# Patient Record
Sex: MALE | Race: WHITE | Employment: FULL TIME | URBAN - METROPOLITAN AREA
[De-identification: names, ages, dates, MRNs, and addresses within clinical notes are randomized per-mention and may not be internally consistent; named-entity substitution may affect disease eponyms.]

---

## 2020-06-19 ENCOUNTER — OFFICE VISIT (OUTPATIENT)
Dept: FAMILY MEDICINE CLINIC | Facility: CLINIC | Age: 35
End: 2020-06-19
Payer: COMMERCIAL

## 2020-06-19 VITALS
DIASTOLIC BLOOD PRESSURE: 68 MMHG | HEIGHT: 68.5 IN | WEIGHT: 128 LBS | HEART RATE: 71 BPM | SYSTOLIC BLOOD PRESSURE: 126 MMHG | BODY MASS INDEX: 19.18 KG/M2 | RESPIRATION RATE: 16 BRPM | TEMPERATURE: 98 F | OXYGEN SATURATION: 98 %

## 2020-06-19 DIAGNOSIS — Z12.4 SCREENING FOR MALIGNANT NEOPLASM OF CERVIX: ICD-10-CM

## 2020-06-19 DIAGNOSIS — Z13.89 SCREENING FOR GENITOURINARY CONDITION: ICD-10-CM

## 2020-06-19 DIAGNOSIS — Z00.00 PHYSICAL EXAM, ANNUAL: Primary | ICD-10-CM

## 2020-06-19 DIAGNOSIS — Z00.00 LABORATORY EXAMINATION ORDERED AS PART OF A ROUTINE GENERAL MEDICAL EXAMINATION: ICD-10-CM

## 2020-06-19 DIAGNOSIS — D22.9 MULTIPLE NEVI: ICD-10-CM

## 2020-06-19 PROCEDURE — 99385 PREV VISIT NEW AGE 18-39: CPT | Performed by: FAMILY MEDICINE

## 2020-06-19 NOTE — PROGRESS NOTES
Ehsan Yusuf is a 29year old male who presents for a complete physical exam.   HPI:   Pt has no complaints. On June 8 his daughter was born. She has cystic fibrosis and she had some bowel obstruction with meconium. She underwent 2 surgeries.   Patient 19.18 kg/m².    GENERAL: well developed, well nourished,in no apparent distress  SKIN: no rashes,no suspicious lesions, multiple nevi on the back  HEENT: atraumatic, normocephalic,ears and throat are clear  EYES:PERRLA, EOMI, conjunctiva are clear  NECK: tyler asked to return for CPX in 1 year.

## 2020-06-19 NOTE — PATIENT INSTRUCTIONS
Healthy diet. Stay active. Call [phone] to schedule  blood test.    Call dermatologist for evaluation.

## 2020-06-26 ENCOUNTER — TELEPHONE (OUTPATIENT)
Dept: FAMILY MEDICINE CLINIC | Facility: CLINIC | Age: 35
End: 2020-06-26

## 2020-06-26 NOTE — TELEPHONE ENCOUNTER
Spoke with the pt and informed him that he can call to schedule his Nurse visit for his Tdap vaccine. His vaccine is in the lab refrigerator in the door on the second shelf.

## 2024-12-20 ENCOUNTER — LAB ENCOUNTER (OUTPATIENT)
Dept: LAB | Age: 39
End: 2024-12-20
Attending: FAMILY MEDICINE
Payer: COMMERCIAL

## 2024-12-20 ENCOUNTER — OFFICE VISIT (OUTPATIENT)
Dept: FAMILY MEDICINE CLINIC | Facility: CLINIC | Age: 39
End: 2024-12-20
Payer: COMMERCIAL

## 2024-12-20 VITALS
DIASTOLIC BLOOD PRESSURE: 80 MMHG | WEIGHT: 139 LBS | HEART RATE: 78 BPM | RESPIRATION RATE: 16 BRPM | SYSTOLIC BLOOD PRESSURE: 122 MMHG | HEIGHT: 69 IN | BODY MASS INDEX: 20.59 KG/M2 | TEMPERATURE: 97 F

## 2024-12-20 DIAGNOSIS — Z13.89 SCREENING FOR GENITOURINARY CONDITION: ICD-10-CM

## 2024-12-20 DIAGNOSIS — D22.9 MULTIPLE NEVI: ICD-10-CM

## 2024-12-20 DIAGNOSIS — Z00.00 PHYSICAL EXAM, ANNUAL: Primary | ICD-10-CM

## 2024-12-20 DIAGNOSIS — Z00.00 PHYSICAL EXAM, ANNUAL: ICD-10-CM

## 2024-12-20 DIAGNOSIS — Z00.00 LABORATORY EXAMINATION ORDERED AS PART OF A ROUTINE GENERAL MEDICAL EXAMINATION: ICD-10-CM

## 2024-12-20 LAB
ALBUMIN SERPL-MCNC: 4.7 G/DL (ref 3.2–4.8)
ALBUMIN/GLOB SERPL: 1.6 {RATIO} (ref 1–2)
ALP LIVER SERPL-CCNC: 65 U/L
ALT SERPL-CCNC: 16 U/L
ANION GAP SERPL CALC-SCNC: 10 MMOL/L (ref 0–18)
AST SERPL-CCNC: 19 U/L (ref ?–34)
BASOPHILS # BLD AUTO: 0.03 X10(3) UL (ref 0–0.2)
BASOPHILS NFR BLD AUTO: 0.4 %
BILIRUB SERPL-MCNC: 0.9 MG/DL (ref 0.3–1.2)
BILIRUB UR QL STRIP.AUTO: NEGATIVE
BUN BLD-MCNC: 9 MG/DL (ref 9–23)
CALCIUM BLD-MCNC: 9.6 MG/DL (ref 8.7–10.4)
CHLORIDE SERPL-SCNC: 103 MMOL/L (ref 98–112)
CHOLEST SERPL-MCNC: 209 MG/DL (ref ?–200)
CLARITY UR REFRACT.AUTO: CLEAR
CO2 SERPL-SCNC: 29 MMOL/L (ref 21–32)
CREAT BLD-MCNC: 0.9 MG/DL
EGFRCR SERPLBLD CKD-EPI 2021: 111 ML/MIN/1.73M2 (ref 60–?)
EOSINOPHIL # BLD AUTO: 0.39 X10(3) UL (ref 0–0.7)
EOSINOPHIL NFR BLD AUTO: 4.9 %
ERYTHROCYTE [DISTWIDTH] IN BLOOD BY AUTOMATED COUNT: 12.4 %
FASTING PATIENT LIPID ANSWER: YES
FASTING STATUS PATIENT QL REPORTED: YES
GLOBULIN PLAS-MCNC: 2.9 G/DL (ref 2–3.5)
GLUCOSE BLD-MCNC: 92 MG/DL (ref 70–99)
GLUCOSE UR STRIP.AUTO-MCNC: NORMAL MG/DL
HCT VFR BLD AUTO: 47 %
HDLC SERPL-MCNC: 70 MG/DL (ref 40–59)
HGB BLD-MCNC: 16.2 G/DL
IMM GRANULOCYTES # BLD AUTO: 0.02 X10(3) UL (ref 0–1)
IMM GRANULOCYTES NFR BLD: 0.3 %
KETONES UR STRIP.AUTO-MCNC: NEGATIVE MG/DL
LDLC SERPL CALC-MCNC: 130 MG/DL (ref ?–100)
LEUKOCYTE ESTERASE UR QL STRIP.AUTO: NEGATIVE
LYMPHOCYTES # BLD AUTO: 3.02 X10(3) UL (ref 1–4)
LYMPHOCYTES NFR BLD AUTO: 38.3 %
MCH RBC QN AUTO: 30.8 PG (ref 26–34)
MCHC RBC AUTO-ENTMCNC: 34.5 G/DL (ref 31–37)
MCV RBC AUTO: 89.4 FL
MONOCYTES # BLD AUTO: 0.97 X10(3) UL (ref 0.1–1)
MONOCYTES NFR BLD AUTO: 12.3 %
NEUTROPHILS # BLD AUTO: 3.45 X10 (3) UL (ref 1.5–7.7)
NEUTROPHILS # BLD AUTO: 3.45 X10(3) UL (ref 1.5–7.7)
NEUTROPHILS NFR BLD AUTO: 43.8 %
NITRITE UR QL STRIP.AUTO: NEGATIVE
NONHDLC SERPL-MCNC: 139 MG/DL (ref ?–130)
OSMOLALITY SERPL CALC.SUM OF ELEC: 292 MOSM/KG (ref 275–295)
PH UR STRIP.AUTO: 7.5 [PH] (ref 5–8)
PLATELET # BLD AUTO: 304 10(3)UL (ref 150–450)
POTASSIUM SERPL-SCNC: 4 MMOL/L (ref 3.5–5.1)
PROT SERPL-MCNC: 7.6 G/DL (ref 5.7–8.2)
PROT UR STRIP.AUTO-MCNC: NEGATIVE MG/DL
RBC # BLD AUTO: 5.26 X10(6)UL
RBC UR QL AUTO: NEGATIVE
SODIUM SERPL-SCNC: 142 MMOL/L (ref 136–145)
SP GR UR STRIP.AUTO: 1.02 (ref 1–1.03)
TRIGL SERPL-MCNC: 52 MG/DL (ref 30–149)
TSI SER-ACNC: 1.18 UIU/ML (ref 0.55–4.78)
UROBILINOGEN UR STRIP.AUTO-MCNC: NORMAL MG/DL
VLDLC SERPL CALC-MCNC: 9 MG/DL (ref 0–30)
WBC # BLD AUTO: 7.9 X10(3) UL (ref 4–11)

## 2024-12-20 PROCEDURE — 80050 GENERAL HEALTH PANEL: CPT | Performed by: FAMILY MEDICINE

## 2024-12-20 PROCEDURE — 81003 URINALYSIS AUTO W/O SCOPE: CPT | Performed by: FAMILY MEDICINE

## 2024-12-20 PROCEDURE — 80061 LIPID PANEL: CPT | Performed by: FAMILY MEDICINE

## 2024-12-20 NOTE — PROGRESS NOTES
Jean Celeste is a 39 year old male who presents for a complete physical exam.   HPI:   Pt has no complaints.  Has multiple nevi refer to dermatology.    Immunization History   Administered Date(s) Administered    Covid-19 Vaccine Pfizer 30 mcg/0.3 ml 06/12/2021, 07/02/2021   Pended Date(s) Pended    TDAP 06/19/2020     Wt Readings from Last 6 Encounters:   12/20/24 139 lb (63 kg)   06/19/20 128 lb (58.1 kg)     Body mass index is 20.53 kg/m².     Cholesterol, Total (mg/dL)   Date Value   12/20/2024 209 (H)     HDL Cholesterol (mg/dL)   Date Value   12/20/2024 70 (H)     LDL Cholesterol (mg/dL)   Date Value   12/20/2024 130 (H)     AST (U/L)   Date Value   12/20/2024 19     ALT (U/L)   Date Value   12/20/2024 16      No results found for: \"PSA\"  No results found for: \"GLUCOSE\"    No current outpatient medications on file.      History reviewed. No pertinent past medical history.   History reviewed. No pertinent surgical history.   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  Diet: watches minimally     REVIEW OF SYSTEMS:   GENERAL: feels well otherwise  SKIN: denies any unusual skin lesions  EYES:denies blurred vision or double vision  HEENT: denies nasal congestion, sinus pain or ST  LUNGS: denies shortness of breath with exertion  CARDIOVASCULAR: denies chest pain on exertion  GI: denies abdominal pain,denies heartburn  : denies nocturia or changes in  stream  MUSCULOSKELETAL: denies back pain  NEURO: denies headaches  PSYCHE: denies depression or anxiety  HEMATOLOGIC: denies hx of anemia  ENDOCRINE: denies thyroid history  ALL/ASTHMA: denies hx of allergy or asthma    EXAM:   /80 (BP Location: Left arm, Patient Position: Sitting, Cuff Size: adult)   Pulse 78   Temp 97.2 °F (36.2 °C) (Temporal)   Resp 16   Ht 5' 9\" (1.753 m)   Wt 139 lb (63 kg)   BMI 20.53 kg/m²   Body mass index is 20.53 kg/m².   GENERAL: well developed, well nourished,in no apparent distress  SKIN: no rashes,no suspicious lesions  HEENT: atraumatic, normocephalic,ears and throat are clear  EYES:PERRLA, EOMI, conjunctiva are clear  NECK: supple,no adenopathy  CHEST: no chest tenderness  BREAST: no dominant or suspicious mass  LUNGS: clear to auscultation  CARDIO: RRR without murmur  GI: good BS's,no masses, HSM or tenderness  : two descended testes,no masses,no hernia,no penile lesions  RECTAL: Deferred  MUSCULOSKELETAL: back is not tender,FROM of the back  EXTREMITIES: no cyanosis, clubbing or edema  NEURO: Oriented times three,cranial nerves are intact,motor and sensory are grossly intact    ASSESSMENT AND PLAN:   Jean Celeste is a 39 year old male who presents for a complete physical exam.   Encounter Diagnoses   Name Primary?    Physical exam, annual Yes    Laboratory examination ordered as part of a routine general medical examination     Screening for genitourinary condition     Multiple nevi        Orders Placed This Encounter   Procedures    CBC With Differential With Platelet    Comp Metabolic Panel (14)    Lipid Panel    Urinalysis with Culture Reflex    TSH W Reflex To Free T4       Meds & Refills for this Visit:  Requested Prescriptions      No prescriptions requested or ordered in this encounter   Healthy diet.  Stay active.   Tdap -tetanus shot.   See dermatologist for evaluation.    Imaging & Consults:  DERM - INTERNAL   Pt's weight is Body mass index is 20.53  kg/m²., recommended low carb diet and aerobic exercise 30 minutes three times weekly. Health maintenance, will check fasting Lipids, CMP, CBC and PSA. Pt referred for screening colonoscopy. The patient indicates understanding of these issues and agrees to the plan.  The patient is asked to return for CPX in 1year.  The note was dictated using speech recognition software.  Accuracy and grammar in transcription may be subject to error.